# Patient Record
Sex: FEMALE | Race: WHITE | Employment: FULL TIME | URBAN - METROPOLITAN AREA
[De-identification: names, ages, dates, MRNs, and addresses within clinical notes are randomized per-mention and may not be internally consistent; named-entity substitution may affect disease eponyms.]

---

## 2017-01-26 ENCOUNTER — OFFICE VISIT (OUTPATIENT)
Dept: GYNECOLOGY | Age: 57
End: 2017-01-26

## 2017-01-26 VITALS
BODY MASS INDEX: 41.02 KG/M2 | DIASTOLIC BLOOD PRESSURE: 100 MMHG | WEIGHT: 293 LBS | HEIGHT: 71 IN | SYSTOLIC BLOOD PRESSURE: 161 MMHG | HEART RATE: 90 BPM

## 2017-01-26 DIAGNOSIS — C54.1 ENDOMETRIAL CANCER (HCC): Primary | ICD-10-CM

## 2017-01-26 NOTE — PROGRESS NOTES
[facility] GYNECOLOGIC ONCOLOGY  [address] [facility], [address]  [phone]  MD Anuj Rosario MD Alean Horseman, [place]  Office Visit    Patient ID:  Name: Alise Powell  MRN: [id number]  : /66 y.o. Visit date: 2017    Primary Diagnosis:     ICD-10-CM ICD-9-CM    1. Endometrial cancer ([facility] C54.1 182.0 PAP, IG, RFX HPV ASCUS ([id number])          Problem List:    Patient Active Problem List   Diagnosis Code    Complex atypical endometrial hyperplasia N85.02    Endometrial cancer ([facility] C54.1       INTERVAL HISTORY: Alise Powell is a  female with a history of stage IB, grade 1 endometrial carcinoma. She underwent TLH, BSO, with staging in 2015. She had negative nodes, but did have positive pelvic washings. We did not recommend any adjuvant therapy. She presents today for surveillance. She is doing well and is without complaints. She denies any vaginal bleeding or discharge, any pelvic or abdominal pain, or any changes in her bowel or bladder habits. ROS:  Negative  and GI review. Negative cardiopulmonary review. No SOB, palpitations. No syncope  Negative musculoskeletal review. Negative Psychological/Neurological review. PMH:  Past Medical History   Diagnosis Date    Diabetes ([facility]     Environmental and seasonal allergies     Hyperlipidemia     Hypertension     Macular edema     Obesity      PSH:  Past Surgical History   Procedure Laterality Date    Hx gyn       X2 vaginal deliveries    Hx nephrectomy  2012     right KIDNEY FOR BENIGN MASS    Hx heent  5/15     left cataract sx    Hx heent  6/15     right cataract sx    Hx total laparoscopic hysterectomy w/ bs&o  2015     SOC:  Social History     Social History    Marital status:      Spouse name: N/A    Number of children: N/A    Years of education: N/A     Occupational History    Not on file.      Social History Main Topics    Smoking status: Never Smoker    Smokeless tobacco: Never Used    Alcohol use No    Drug use: No    Sexual activity: Not on file     Other Topics Concern    Not on file     Social History Narrative     Family History  Family History   Problem Relation Age of Onset    Cancer Maternal Aunt 61     bladder    Anesth Problems Neg Hx      Medications: (reviewed)  Current Outpatient Prescriptions on File Prior to Visit   Medication Sig Dispense Refill    metFORMIN (GLUCOPHAGE) 1,000 mg tablet 2,000 mg.      QVAR 80 mcg/actuation inhaler       PROAIR HFA 90 mcg/actuation inhaler       montelukast (SINGULAIR) 10 mg tablet 10 mg daily.  sertraline (ZOLOFT) 50 mg tablet 50 mg daily.  losartan (COZAAR) 50 mg tablet 50 mg daily.  glipiZIDE SR (GLUCOTROL) 10 mg CR tablet 10 mg daily.  atorvastatin (LIPITOR) 20 mg tablet 20 mg nightly.  cetirizine (ZYRTEC) 10 mg tablet Take 10 mg by mouth daily.  acetaminophen-codeine (TYLENOL #3) 300-30 mg per tablet       oxyCODONE-acetaminophen (PERCOCET) 5-325 mg per tablet Take 1 Tab by mouth every four (4) hours as needed for Pain. Max Daily Amount: 6 Tabs. Indications: PAIN 20 Tab 0    TANZEUM 30 mg/0.5 mL pnij Every Thursday.  HYDROcodone-acetaminophen (NORCO) 5-325 mg per tablet        No current facility-administered medications on file prior to visit. Allergies: (reviewed)  No Known Allergies    OBJECTIVE:    PHYSICAL EXAM  VITAL SIGNS: Vitals:    01/26/17 1443   BP: (!) 161/100   Pulse: 90   Weight: 294 lb 9.6 oz (133.6 kg)   Height: 5' 10.98\" (1.803 m)     Body mass index is 41.11 kg/(m^2). GENERAL AUTUMN: Conversant, alert, oriented. NAD   HEENT: Normocephalic. Oropharynx clear. Neck: Supple. Trachea midline, no JVD, no thyroid enlargement   RESPIRATORY: Lung fields clear to auscultation and percussion. No rales/rhonchi.  Adequate respiratory effort   CARDIOVASC: Rate regular, without murmur   GASTROINT: soft, non-tender, without masses or organomegaly. No hernia noted   MUSCULOSKEL: FROM. No focal tenderness. EXTREMITIES: extremities normal, atraumatic, no cyanosis or edema. Pulses appreciated. PELVIC: External genitalia: normal general appearance  Urinary system: urethral meatus normal  Vaginal: normal without tenderness, induration or masses  Cervix: absent  Adnexa: removed surgically  Uterus: absent   RECTAL: Deferred   BECCA SURVEY: Negative throughout---neck, axillae, inguina. NEURO: Grossly intact, no acute deficit. Oriented. Not depressed. Not agitated. IMPRESSION AND PLAN:  Fan Worrell has a diagnosis of stage IB, grade 1 endometrial carcinoma. She has no evidence of disease on today's exam.  We will follow up on today's pap smear and I will see her back in 3 months for continues surveillance.       Jaguar Osborn MD  1/26/2017/2:34 PM

## 2017-01-26 NOTE — PROGRESS NOTES
3 month check up,Patient states no abnormal spotting or bleeding. Patient states no questions or concerns for today's visit. Pt is no longer taking Norco, Percocet, and Tylenol 3.

## 2017-02-01 LAB
CYTOLOGIST CVX/VAG CYTO: NORMAL
CYTOLOGY CVX/VAG DOC THIN PREP: NORMAL
DX ICD CODE: NORMAL
LABCORP, 190119: NORMAL
Lab: NORMAL
Lab: NORMAL
OTHER STN SPEC: NORMAL
PATH REPORT.FINAL DX SPEC: NORMAL
STAT OF ADQ CVX/VAG CYTO-IMP: NORMAL

## 2024-11-05 ENCOUNTER — TELEPHONE (OUTPATIENT)
Age: 64
End: 2024-11-05

## 2024-11-05 NOTE — TELEPHONE ENCOUNTER
Referral received to schedule a New Patient appointment for Chronic Headaches and Feeling Off Balance. LVM to call and schedule for the next available.

## 2024-11-29 NOTE — TELEPHONE ENCOUNTER
Reached back out to schedule a New Patient Appointment LVM to call and schedule if the appointment was still needed. Referral scanned in.

## 2025-06-24 ENCOUNTER — OFFICE VISIT (OUTPATIENT)
Age: 65
End: 2025-06-24
Payer: COMMERCIAL

## 2025-06-24 VITALS
SYSTOLIC BLOOD PRESSURE: 125 MMHG | OXYGEN SATURATION: 97 % | RESPIRATION RATE: 16 BRPM | HEART RATE: 89 BPM | DIASTOLIC BLOOD PRESSURE: 67 MMHG

## 2025-06-24 DIAGNOSIS — G43.909 EPISODIC MIGRAINE: ICD-10-CM

## 2025-06-24 DIAGNOSIS — R26.89 ABNORMALITY OF GAIT DUE TO IMPAIRMENT OF BALANCE: Primary | ICD-10-CM

## 2025-06-24 DIAGNOSIS — R51.9 CHRONIC INTRACTABLE HEADACHE, UNSPECIFIED HEADACHE TYPE: ICD-10-CM

## 2025-06-24 DIAGNOSIS — R29.898 WEAKNESS OF BOTH LOWER EXTREMITIES: ICD-10-CM

## 2025-06-24 DIAGNOSIS — G89.29 CHRONIC INTRACTABLE HEADACHE, UNSPECIFIED HEADACHE TYPE: ICD-10-CM

## 2025-06-24 PROCEDURE — 99205 OFFICE O/P NEW HI 60 MIN: CPT | Performed by: NURSE PRACTITIONER

## 2025-06-24 PROCEDURE — 1123F ACP DISCUSS/DSCN MKR DOCD: CPT | Performed by: NURSE PRACTITIONER

## 2025-06-24 RX ORDER — MONTELUKAST SODIUM 10 MG/1
TABLET ORAL
COMMUNITY
Start: 2025-04-14

## 2025-06-24 RX ORDER — CETIRIZINE HYDROCHLORIDE 10 MG/1
10 TABLET ORAL DAILY
COMMUNITY

## 2025-06-24 RX ORDER — TIMOLOL MALEATE 5 MG/ML
SOLUTION/ DROPS OPHTHALMIC
COMMUNITY
Start: 2025-04-09

## 2025-06-24 RX ORDER — TIRZEPATIDE 5 MG/.5ML
INJECTION, SOLUTION SUBCUTANEOUS
COMMUNITY
Start: 2025-06-12

## 2025-06-24 RX ORDER — CEFUROXIME AXETIL 250 MG/1
TABLET ORAL
COMMUNITY
Start: 2025-06-20

## 2025-06-24 RX ORDER — LOSARTAN POTASSIUM 50 MG/1
TABLET ORAL
COMMUNITY

## 2025-06-24 RX ORDER — DAPAGLIFLOZIN AND METFORMIN HYDROCHLORIDE 10; 500 MG/1; MG/1
10-500 TABLET, FILM COATED, EXTENDED RELEASE ORAL
COMMUNITY
Start: 2025-05-08

## 2025-06-24 RX ORDER — ATORVASTATIN CALCIUM 20 MG/1
20 TABLET, FILM COATED ORAL DAILY
COMMUNITY
Start: 2025-06-16

## 2025-06-24 RX ORDER — UBROGEPANT 100 MG/1
TABLET ORAL
Qty: 16 TABLET | Refills: 4 | Status: SHIPPED | OUTPATIENT
Start: 2025-06-24

## 2025-06-24 RX ORDER — INSULIN GLARGINE 100 [IU]/ML
36 INJECTION, SOLUTION SUBCUTANEOUS NIGHTLY
COMMUNITY

## 2025-06-24 RX ORDER — AMLODIPINE BESYLATE 5 MG/1
TABLET ORAL
COMMUNITY

## 2025-06-24 NOTE — PROGRESS NOTES
Cassi Christina is a 64 y.o. female who presents with the following  Chief Complaint   Patient presents with    New Patient     Patient was referred for unsteady gait and headaches. Patient reports that she has had the headaches for some time now.  3-4 year now with the unsteady gait.        HPI    New patient with .   Comes on for headaches and unsteady gait.     She states she has had headaches, migraines for years now  She states they started in her teens.   She felt like they were in the front and side of her head   Pressure, fullness   She states they can be debilitating at times.   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     Balance is also very off.   She feels unsteady, unbalanced.   No recent trauma or changes.   She has diabtetes. Working on her A1c.     Waddling on right side to keep her from moving where she wants to go   Walk forward but one leg swings and starts to turn, she does not want to   7.5 ac1 recently  Takes time to get up and move.   Drags right leg.       Not on File    Current Outpatient Medications   Medication Sig Dispense Refill    atorvastatin (LIPITOR) 20 MG tablet Take 1 tablet by mouth daily      cefUROXime (CEFTIN) 250 MG tablet       XIGDUO XR  MG TB24  mg      montelukast (SINGULAIR) 10 MG tablet       MOUNJARO 5 MG/0.5ML SOAJ pen       timolol (TIMOPTIC) 0.5 % ophthalmic solution       Ubrogepant (UBRELVY) 100 MG TABS 1 at HA onset and repeat in 2 hours if needed.  Max 2 in 24 hours 16 tablet 4    cetirizine (ZYRTEC) 10 MG tablet Take 1 tablet by mouth daily      losartan (COZAAR) 50 MG tablet 1 tab(s) orally once a day for 90 days      amLODIPine (NORVASC) 5 MG tablet 1 tab(s) orally once a day for 90 days      LANTUS SOLOSTAR 100 UNIT/ML injection pen Inject 3,600 Units into

## 2025-06-30 ENCOUNTER — TELEPHONE (OUTPATIENT)
Age: 65
End: 2025-06-30

## 2025-07-23 ENCOUNTER — HOSPITAL ENCOUNTER (OUTPATIENT)
Facility: HOSPITAL | Age: 65
Discharge: HOME | End: 2025-07-26
Payer: COMMERCIAL

## 2025-07-23 DIAGNOSIS — R26.89 ABNORMALITY OF GAIT DUE TO IMPAIRMENT OF BALANCE: ICD-10-CM

## 2025-07-23 DIAGNOSIS — R51.9 CHRONIC INTRACTABLE HEADACHE, UNSPECIFIED HEADACHE TYPE: ICD-10-CM

## 2025-07-23 DIAGNOSIS — G89.29 CHRONIC INTRACTABLE HEADACHE, UNSPECIFIED HEADACHE TYPE: ICD-10-CM

## 2025-07-23 PROCEDURE — 70553 MRI BRAIN STEM W/O & W/DYE: CPT

## 2025-07-23 PROCEDURE — 6360000004 HC RX CONTRAST MEDICATION: Performed by: NURSE PRACTITIONER

## 2025-07-23 PROCEDURE — A9579 GAD-BASE MR CONTRAST NOS,1ML: HCPCS | Performed by: NURSE PRACTITIONER

## 2025-07-23 RX ORDER — GADOTERIDOL 279.3 MG/ML
20 INJECTION INTRAVENOUS ONCE
Status: COMPLETED | OUTPATIENT
Start: 2025-07-23 | End: 2025-07-23

## 2025-07-23 RX ADMIN — GADOTERIDOL 20 ML: 279.3 INJECTION, SOLUTION INTRAVENOUS at 16:27

## 2025-08-29 ENCOUNTER — PROCEDURE VISIT (OUTPATIENT)
Age: 65
End: 2025-08-29
Payer: COMMERCIAL

## 2025-08-29 DIAGNOSIS — R20.2 PARESTHESIA: ICD-10-CM

## 2025-08-29 DIAGNOSIS — R26.89 ABNORMALITY OF GAIT DUE TO IMPAIRMENT OF BALANCE: Primary | ICD-10-CM

## 2025-08-29 DIAGNOSIS — R29.898 WEAKNESS OF BOTH LOWER EXTREMITIES: ICD-10-CM

## 2025-08-29 PROCEDURE — 95886 MUSC TEST DONE W/N TEST COMP: CPT | Performed by: PSYCHIATRY & NEUROLOGY

## 2025-08-29 PROCEDURE — 95913 NRV CNDJ TEST 13/> STUDIES: CPT | Performed by: PSYCHIATRY & NEUROLOGY
